# Patient Record
Sex: MALE | NOT HISPANIC OR LATINO | Employment: OTHER | URBAN - METROPOLITAN AREA
[De-identification: names, ages, dates, MRNs, and addresses within clinical notes are randomized per-mention and may not be internally consistent; named-entity substitution may affect disease eponyms.]

---

## 2017-01-07 DIAGNOSIS — E03.8 SECONDARY HYPOTHYROIDISM: ICD-10-CM

## 2017-01-09 RX ORDER — LEVOTHYROXINE SODIUM 75 MCG
TABLET ORAL
Qty: 30 TABLET | Refills: 0 | Status: SHIPPED | OUTPATIENT
Start: 2017-01-09 | End: 2018-08-04

## 2017-02-02 DIAGNOSIS — E78.1 HYPERTRIGLYCERIDEMIA: ICD-10-CM

## 2017-02-02 RX ORDER — ICOSAPENT ETHYL 1000 MG/1
CAPSULE ORAL
Qty: 120 CAPSULE | Refills: 0 | Status: SHIPPED | OUTPATIENT
Start: 2017-02-02 | End: 2017-03-07 | Stop reason: SDUPTHER

## 2017-02-06 ENCOUNTER — OFFICE VISIT (OUTPATIENT)
Dept: ENDOCRINOLOGY | Age: 62
End: 2017-02-06

## 2017-02-06 VITALS
DIASTOLIC BLOOD PRESSURE: 84 MMHG | HEIGHT: 68 IN | WEIGHT: 255 LBS | SYSTOLIC BLOOD PRESSURE: 140 MMHG | BODY MASS INDEX: 38.65 KG/M2

## 2017-02-06 DIAGNOSIS — E67.3 HYPERVITAMINOSIS D: ICD-10-CM

## 2017-02-06 DIAGNOSIS — E03.8 SECONDARY HYPOTHYROIDISM: ICD-10-CM

## 2017-02-06 DIAGNOSIS — R73.9 HYPERGLYCEMIA: ICD-10-CM

## 2017-02-06 DIAGNOSIS — IMO0002 UNCONTROLLED TYPE 2 DIABETES MELLITUS WITH COMPLICATION, WITH LONG-TERM CURRENT USE OF INSULIN: Primary | ICD-10-CM

## 2017-02-06 DIAGNOSIS — Z94.9 TRANSPLANT: ICD-10-CM

## 2017-02-06 DIAGNOSIS — E78.2 MIXED HYPERLIPIDEMIA: ICD-10-CM

## 2017-02-06 PROCEDURE — 99214 OFFICE O/P EST MOD 30 MIN: CPT | Performed by: NURSE PRACTITIONER

## 2017-02-06 RX ORDER — PIOGLITAZONEHYDROCHLORIDE 30 MG/1
30 TABLET ORAL DAILY
Qty: 30 TABLET | Refills: 4 | Status: SHIPPED | OUTPATIENT
Start: 2017-02-06 | End: 2018-02-06

## 2017-02-06 NOTE — PROGRESS NOTES
Deepak David  Presents to the office  for the follow-up appointment for Type 2 diabetes mellitus.     Location is system with the  clinical course has fluctuated, the severity is Moderate, and the modifying/allievating factors are insulin.  Medications and  Dosages were  reviewed with Deepak David and suggested that compliance all of the time.    Associated symptoms of hyperglycemia have been none.  Associated symptoms of hypoglycemia have been jitteriness, sweating and weakness. Patient reports  hypoglycemia threshold for symptoms is 60 mg/dl .     The patient is currently on insulin.    Compliance with blood glucose monitoring: good.     Meal panning: The patient is using avoidance of concentrated sweets.    The patient is currently taking home blood tests - Blood glucose testin times daily, that are:  fasting- 1st thing in morning before eating or drinking  bedtime  Humulin U500 120 units with each meal    Last instructions given were:  Humulin U500  122 units before breakfast, 102 units before lunch, 102 units before dinner     Office visit 24 minutes with a ditional education given 12 minutes - need to titrate insulin, CHO balance, nutrition counseling.    Home blood glucose testing daily: 2  FB to 214 mg/dl  bedtime 200 to 245 mg/dl    Last reported blood glucose readings are:   Home blood glucose testing daily: 3  FB to 210 mg/dl  before lunch 180 to 230 mg/dl  before dinner/supper 280 to 320 mg/dl    Patterns reported per patient are that he cannot get his B/S down below 186. Patient states he doesn't consume and sugars or red meat anymore.          The following portions of the patient's history were reviewed and updated as appropriate: current medications, past family history, past medical history, past social history, past surgical history and problem list.      Current Outpatient Prescriptions:   •  ACCU-CHEK ROSEMARY PLUS test strip, , Disp: , Rfl:   •  allopurinol (ZYLOPRIM) 100 MG  tablet, , Disp: , Rfl:   •  Alogliptin Benzoate (NESINA) 25 MG tablet, Take 1 daily, Disp: 30 tablet, Rfl: 4  •  aspirin 81 MG tablet, Take  by mouth., Disp: , Rfl:   •  betamethasone valerate (VALISONE) 0.1 % cream, Apply  topically., Disp: , Rfl:   •  cefdinir (OMNICEF) 300 MG capsule, Take  by mouth., Disp: , Rfl:   •  colchicine 0.6 MG tablet, Take  by mouth., Disp: , Rfl:   •  febuxostat (ULORIC) 40 MG tablet, Take  by mouth., Disp: , Rfl:   •  fluconazole (DIFLUCAN) 200 MG tablet, Take  by mouth., Disp: , Rfl:   •  furosemide (LASIX) 20 MG tablet, , Disp: , Rfl:   •  gabapentin (NEURONTIN) 300 MG capsule, Take  by mouth., Disp: , Rfl:   •  Insulin Pen Needle 31G X 8 MM misc, 3 inj daily, Disp: 120 each, Rfl: 6  •  Insulin Regular Human, Conc, (HumuLIN R) 500 UNIT/ML solution pen-injector CONCENTRATED injection, Inject 112 units at breakfast, inject 84 units at lunch and dinner will titrate her office with blood glucose phone in., Disp: 9 pen, Rfl: 1  •  isosorbide mononitrate (IMDUR) 30 MG 24 hr tablet, , Disp: , Rfl:   •  leflunomide (ARAVA) 20 MG tablet, Take  by mouth., Disp: , Rfl:   •  levofloxacin (LEVAQUIN) 500 MG tablet, Take  by mouth., Disp: , Rfl:   •  lovastatin (MEVACOR) 40 MG tablet, Take  by mouth., Disp: , Rfl:   •  LYRICA 150 MG capsule, , Disp: , Rfl:   •  metoprolol tartrate (LOPRESSOR) 25 MG tablet, Take  by mouth., Disp: , Rfl:   •  metoprolol tartrate (LOPRESSOR) 50 MG tablet, , Disp: , Rfl:   •  Multiple Vitamin tablet, Take  by mouth., Disp: , Rfl:   •  nitroglycerin (NITROSTAT) 0.4 MG SL tablet, Place  under the tongue., Disp: , Rfl:   •  oxyCODONE (ROXICODONE) 10 MG tablet, , Disp: , Rfl:   •  oxyCODONE (ROXICODONE) 5 MG immediate release tablet, Take  by mouth., Disp: , Rfl:   •  pantoprazole (PROTONIX) 40 MG EC tablet, , Disp: , Rfl:   •  pentoxifylline (TRENtal) 400 MG CR tablet, , Disp: , Rfl:   •  predniSONE (DELTASONE) 5 MG tablet, Take  by mouth., Disp: , Rfl:   •  pregabalin  "(LYRICA) 100 MG capsule, Take 100 mg by mouth 2 (two) times a day., Disp: , Rfl:   •  SYNTHROID 75 MCG tablet, TAKE 1 TABLET BY MOUTH EVERY DAY, Disp: 30 tablet, Rfl: 0  •  Tacrolimus (ASTAGRAF XL) 1 MG capsule sustained-release 24 hr, Take  by mouth., Disp: , Rfl:   •  VASCEPA 1 G capsule capsule, TAKE 2 CAPSULES BY MOUTH TWICE DAILY WITH MEALS, Disp: 120 capsule, Rfl: 0  •  Dulaglutide 0.75 MG/0.5ML solution pen-injector, Inject 0.75 mg under the skin 1 (One) Time Per Week., Disp: 30 pen, Rfl: 4  •  pioglitazone (ACTOS) 30 MG tablet, Take 1 tablet by mouth Daily., Disp: 30 tablet, Rfl: 4    Patient Active Problem List    Diagnosis   • Uncontrolled type 2 diabetes mellitus with complication, with long-term current use of insulin [E11.8, E11.65, Z79.4]       Review of Systems   A comprehensive review of the 14 systems was negative except of listed below:  Endocrine: hyperglycemia     Objective:     Wt Readings from Last 3 Encounters:   02/06/17 255 lb (116 kg)   12/19/16 265 lb 9.6 oz (120 kg)   11/21/16 271 lb 6.4 oz (123 kg)     Temp Readings from Last 3 Encounters:   No data found for Temp     BP Readings from Last 3 Encounters:   02/06/17 140/84   12/19/16 142/88   11/21/16 140/86     Pulse Readings from Last 3 Encounters:   No data found for Pulse          Visit Vitals   • /84   • Ht 68\" (172.7 cm)   • Wt 255 lb (116 kg)   • BMI 38.77 kg/m2       General appearance:  alert, cooperative, delirious, fatigued, nourished\" \"appears stated age and cooperative\" \"NAD   Neck: no carotid bruit, supple, symmetrical, trachea midline and thyroid not enlarged, symmetric, no tenderness/mass/nodules   Thyroid:  no palpable nodule   Lung:  \"clear to auscultation bilaterally\" \"no abnormal breath sounds  \" effort was normal, no labored breath, no use of accessory muscles.   Heart: regular rate and rhythm, S1, S2 normal, no murmur, click, rub or gallop      Abdomen:  normal bowel sounds- 4 quads, soft non-tender, contour - " "rounded and contour - obese      Extremities: extremities normal, atraumatic, no cyanosis or edema extremities normal, atraumatic, no cyanosis or edema\" WNL - gait and station, strength and stability\"       Skin:   Pulses:  warm and dry, no hyperpigmentation, normal skin coloring, or suspicious lesions   2+ and symmetric   Neuro: Alert and oriented x3. Gait normal. Reflexes and motor strength normal and symmetric. Cranial nerves 2-12 and sensation grossly intact.      Psych: behavior - normal, judgement - normal, mood - normal, affect - normal                 Lab Review  Results for orders placed or performed in visit on 12/19/16   C-Peptide   Result Value Ref Range    C-Peptide 5.6 (H) 1.1 - 4.4 ng/mL   Comprehensive Metabolic Panel   Result Value Ref Range    Glucose 416 (C) 65 - 99 mg/dL    BUN 36 (H) 8 - 23 mg/dL    Creatinine 1.65 (H) 0.76 - 1.27 mg/dL    eGFR Non African Am 42 (L) >60 mL/min/1.73    eGFR African Am 51 (L) >60 mL/min/1.73    BUN/Creatinine Ratio 21.8 7.0 - 25.0    Sodium 138 136 - 145 mmol/L    Potassium 4.8 3.5 - 5.2 mmol/L    Chloride 98 98 - 107 mmol/L    Total CO2 24.5 22.0 - 29.0 mmol/L    Calcium 9.3 8.6 - 10.5 mg/dL    Total Protein 6.6 6.0 - 8.5 g/dL    Albumin 4.00 3.50 - 5.20 g/dL    Globulin 2.6 gm/dL    A/G Ratio 1.5 g/dL    Total Bilirubin 0.5 0.1 - 1.2 mg/dL    Alkaline Phosphatase 205 (H) 39 - 117 U/L    AST (SGOT) 82 (H) 1 - 40 U/L    ALT (SGPT) 93 (H) 1 - 41 U/L   Hemoglobin A1c   Result Value Ref Range    Hemoglobin A1C 12.20 (H) 4.80 - 5.60 %   Insulin, Total   Result Value Ref Range    Insulin 284.1 (H) 2.6 - 24.9 uIU/mL   Lipid Panel   Result Value Ref Range    Total Cholesterol 130 0 - 200 mg/dL    Triglycerides 571 (H) 0 - 150 mg/dL    HDL Cholesterol 27 (L) 40 - 60 mg/dL    VLDL Cholesterol CANCELED mg/dL    LDL Cholesterol  CANCELED mg/dL   Vitamin D 25 Hydroxy   Result Value Ref Range    25 Hydroxy, Vitamin D 31.4 30.0 - 100.0 ng/mL   Microalbumin / Creatinine Urine " Ratio   Result Value Ref Range    Creatinine, Urine 41.3 Not Estab. mg/dL    Microalbumin, Urine <3.0 Not Estab. ug/mL    Microalbumin/Creatinine Ratio Urine <7.3 0.0 - 30.0 mg/g creat   T4, Free   Result Value Ref Range    Free T4 0.86 (L) 0.93 - 1.70 ng/dL   TSH   Result Value Ref Range    TSH 1.640 0.270 - 4.200 mIU/mL   T4   Result Value Ref Range    T4, Total 5.02 4.50 - 11.70 mcg/dL   T3   Result Value Ref Range    T3, Total 80.7 80.0 - 200.0 ng/dL   Thyroid Antibodies   Result Value Ref Range    Thyroid Peroxidase Antibody 24 0 - 34 IU/mL    Thyroglobulin Ab <1.0 0.0 - 0.9 IU/mL   T3, Free   Result Value Ref Range    T3, Free 1.9 (L) 2.0 - 4.4 pg/mL               Assessment:   Deepak was seen today for follow-up.    Diagnoses and all orders for this visit:    Uncontrolled type 2 diabetes mellitus with complication, with long-term current use of insulin  -     Dulaglutide 0.75 MG/0.5ML solution pen-injector; Inject 0.75 mg under the skin 1 (One) Time Per Week.  -     pioglitazone (ACTOS) 30 MG tablet; Take 1 tablet by mouth Daily.    Secondary hypothyroidism    Hypervitaminosis D     Transplant    Hyperglycemia    Mixed hyperlipidemia          Plan:    Education:  interpretation of lab results, blood sugar goals, complications of diabetes mellitus, hypoglycemia prevention and treatment, exercise, illness management, self-monitoring of blood glucose skills, nutrition, carbohydrate counting, site rotation, use of insulin pen, insulin adjustments, self-injection of insulin, use of sliding scale/correction formula and use of insulin: carb ratio    home blood tests -  Blood glucose testin times daily, that are: fasting- 1st thing in morning before eating or drinking, before each meal and 1 or 2 hours after meal, bedtime  anytime you feel symptoms of hyperglycemia or hypoglycemia (high or low blood sugars)    New instructions for basal insulIn Humulin U500 - 122 units before breakfast, 122 units before lunch,  122 units before dinner  Start the Trulicity .75mg/dl weekly  Start the actos 30mg daily in the AM    Office visit 24 minutes with additional education given 12 minutes - the need to Ipro, the need for insulin pump therapy, had been approved- was told cost $70.00/month. Was suppose to be ran back through. Will give him information to call [facility].       Return if symptoms worsen or fail to improve. Follow-up appointment in March with Dr. Vergara-one week prior for labs, keep follow-up appointment in May with Lorena.

## 2017-02-06 NOTE — PATIENT INSTRUCTIONS
home blood tests -  Blood glucose testin times daily, that are:  fasting- 1st thing in morning before eating or drinking  before each meal and 1 or 2 hours after meal  bedtime  anytime you feel symptoms of hyperglycemia or hypoglycemia (high or low blood sugars)     New instructions for basal insulIn Humulin U500 - 125 units before breakfast, 125 units before lunch, 125 units before dinner    Start the Trulicity .75mg/dl weekly  Start the actos 30mg daily in the AM

## 2017-02-16 DIAGNOSIS — IMO0002 UNCONTROLLED TYPE 2 DIABETES MELLITUS WITH COMPLICATION, WITH LONG-TERM CURRENT USE OF INSULIN: Primary | ICD-10-CM

## 2017-02-16 DIAGNOSIS — E55.9 VITAMIN D DEFICIENCY: Primary | ICD-10-CM

## 2017-03-06 DIAGNOSIS — IMO0002 UNCONTROLLED TYPE 2 DIABETES MELLITUS WITH COMPLICATION, WITH LONG-TERM CURRENT USE OF INSULIN: ICD-10-CM

## 2017-03-07 ENCOUNTER — OFFICE VISIT (OUTPATIENT)
Dept: ENDOCRINOLOGY | Age: 62
End: 2017-03-07

## 2017-03-07 VITALS
SYSTOLIC BLOOD PRESSURE: 122 MMHG | BODY MASS INDEX: 39.28 KG/M2 | DIASTOLIC BLOOD PRESSURE: 76 MMHG | HEIGHT: 68 IN | WEIGHT: 259.2 LBS | RESPIRATION RATE: 16 BRPM

## 2017-03-07 DIAGNOSIS — E78.1 HYPERTRIGLYCERIDEMIA: ICD-10-CM

## 2017-03-07 DIAGNOSIS — N40.0 BENIGN NON-NODULAR PROSTATIC HYPERPLASIA WITHOUT LOWER URINARY TRACT SYMPTOMS: ICD-10-CM

## 2017-03-07 DIAGNOSIS — E79.0 HYPERURICEMIA: ICD-10-CM

## 2017-03-07 DIAGNOSIS — E03.9 PRIMARY HYPOTHYROIDISM: ICD-10-CM

## 2017-03-07 DIAGNOSIS — E78.5 DYSLIPIDEMIA: ICD-10-CM

## 2017-03-07 DIAGNOSIS — E55.9 VITAMIN D DEFICIENCY: Primary | ICD-10-CM

## 2017-03-07 DIAGNOSIS — E55.9 VITAMIN D DEFICIENCY: ICD-10-CM

## 2017-03-07 DIAGNOSIS — E66.01 MORBID OBESITY, UNSPECIFIED OBESITY TYPE (HCC): ICD-10-CM

## 2017-03-07 DIAGNOSIS — I10 ESSENTIAL HYPERTENSION: ICD-10-CM

## 2017-03-07 DIAGNOSIS — I25.10 CORONARY ARTERY DISEASE DUE TO LIPID RICH PLAQUE: ICD-10-CM

## 2017-03-07 DIAGNOSIS — I25.83 CORONARY ARTERY DISEASE DUE TO LIPID RICH PLAQUE: ICD-10-CM

## 2017-03-07 DIAGNOSIS — IMO0002 UNCONTROLLED TYPE 2 DIABETES MELLITUS WITH COMPLICATION, WITH LONG-TERM CURRENT USE OF INSULIN: Primary | ICD-10-CM

## 2017-03-07 PROCEDURE — 99215 OFFICE O/P EST HI 40 MIN: CPT | Performed by: INTERNAL MEDICINE

## 2017-03-07 RX ORDER — ICOSAPENT ETHYL 1000 MG/1
CAPSULE ORAL
Qty: 120 CAPSULE | Refills: 0 | Status: SHIPPED | OUTPATIENT
Start: 2017-03-07 | End: 2017-03-07 | Stop reason: SDUPTHER

## 2017-03-07 RX ORDER — NATEGLINIDE 60 MG/1
60 TABLET ORAL
Qty: 90 TABLET | Refills: 5 | Status: SHIPPED | OUTPATIENT
Start: 2017-03-07

## 2017-03-07 RX ORDER — INSULIN DEGLUDEC 200 U/ML
150 INJECTION, SOLUTION SUBCUTANEOUS 2 TIMES DAILY
Qty: 15 PEN | Refills: 5 | Status: SHIPPED | OUTPATIENT
Start: 2017-03-07 | End: 2017-04-04 | Stop reason: SDUPTHER

## 2017-03-07 RX ORDER — ICOSAPENT ETHYL 1000 MG/1
2 CAPSULE ORAL 2 TIMES DAILY WITH MEALS
Qty: 120 CAPSULE | Refills: 5 | Status: SHIPPED | OUTPATIENT
Start: 2017-03-07

## 2017-03-07 RX ORDER — INSULIN HUMAN 500 [IU]/ML
INJECTION, SOLUTION SUBCUTANEOUS
Qty: 12 ML | Refills: 0 | Status: SHIPPED | OUTPATIENT
Start: 2017-03-07 | End: 2017-03-07

## 2017-03-07 RX ORDER — DULAGLUTIDE 1.5 MG/.5ML
1.5 INJECTION, SOLUTION SUBCUTANEOUS WEEKLY
Qty: 4 PEN | Refills: 5 | Status: SHIPPED | OUTPATIENT
Start: 2017-03-07

## 2017-03-07 NOTE — PROGRESS NOTES
"Subjective   Deepak David is a 61 y.o. male seen for follow up for DM2. No lab review. Patient is checking BG 3 times a day. He states that his BG is staying over 200. His BG yesterday morning was 263. He states that his feet are painful, burn, and tingles.     History of Present Illness this is a 61-year-old gentleman known patient with hypertension dyslipidemia type II diabetes as well as the peripheral vascular disease and peripheral neuropathy and hyperuricemia and coronary as well as peripheral vascular disease.  He has had no significant health problems for which to go to emergency room or hospital.  On his current regimen his blood sugar almost consistently rise above 200.  Visit Vitals   • /76   • Resp 16   • Ht 68\" (172.7 cm)   • Wt 259 lb 3.2 oz (118 kg)   • BMI 39.41 kg/m2     Allergies   Allergen Reactions   • Sulfa Antibiotics        Current Outpatient Prescriptions:   •  ACCU-CHEK ROSEMARY PLUS test strip, , Disp: , Rfl:   •  allopurinol (ZYLOPRIM) 100 MG tablet, , Disp: , Rfl:   •  Alogliptin Benzoate (NESINA) 25 MG tablet, Take 1 daily, Disp: 30 tablet, Rfl: 4  •  aspirin 81 MG tablet, Take  by mouth., Disp: , Rfl:   •  betamethasone valerate (VALISONE) 0.1 % cream, Apply  topically., Disp: , Rfl:   •  colchicine 0.6 MG tablet, Take  by mouth., Disp: , Rfl:   •  Dulaglutide 0.75 MG/0.5ML solution pen-injector, Inject 0.75 mg under the skin 1 (One) Time Per Week., Disp: 30 pen, Rfl: 4  •  febuxostat (ULORIC) 40 MG tablet, Take  by mouth., Disp: , Rfl:   •  fluconazole (DIFLUCAN) 200 MG tablet, Take  by mouth., Disp: , Rfl:   •  furosemide (LASIX) 20 MG tablet, , Disp: , Rfl:   •  gabapentin (NEURONTIN) 300 MG capsule, Take  by mouth., Disp: , Rfl:   •  HUMULIN R U-500 KWIKPEN 500 UNIT/ML solution pen-injector CONCENTRATED injection, INJECT 112 UNITS UNDER THE SKIN AT BREAKFAST THEN INJECT 84 UNITS AT LUNCH AND DINNER AS DIRECTED, Disp: 12 mL, Rfl: 0  •  Insulin Pen Needle 31G X 8 MM misc, 3 inj " daily, Disp: 120 each, Rfl: 6  •  isosorbide mononitrate (IMDUR) 30 MG 24 hr tablet, , Disp: , Rfl:   •  leflunomide (ARAVA) 20 MG tablet, Take  by mouth., Disp: , Rfl:   •  lovastatin (MEVACOR) 40 MG tablet, Take  by mouth., Disp: , Rfl:   •  LYRICA 150 MG capsule, , Disp: , Rfl:   •  metoprolol tartrate (LOPRESSOR) 25 MG tablet, Take  by mouth., Disp: , Rfl:   •  metoprolol tartrate (LOPRESSOR) 50 MG tablet, , Disp: , Rfl:   •  Multiple Vitamin tablet, Take  by mouth., Disp: , Rfl:   •  nitroglycerin (NITROSTAT) 0.4 MG SL tablet, Place  under the tongue., Disp: , Rfl:   •  oxyCODONE (ROXICODONE) 10 MG tablet, , Disp: , Rfl:   •  oxyCODONE (ROXICODONE) 5 MG immediate release tablet, Take  by mouth., Disp: , Rfl:   •  pantoprazole (PROTONIX) 40 MG EC tablet, , Disp: , Rfl:   •  pentoxifylline (TRENtal) 400 MG CR tablet, , Disp: , Rfl:   •  pioglitazone (ACTOS) 30 MG tablet, Take 1 tablet by mouth Daily., Disp: 30 tablet, Rfl: 4  •  predniSONE (DELTASONE) 5 MG tablet, Take  by mouth., Disp: , Rfl:   •  pregabalin (LYRICA) 100 MG capsule, Take 100 mg by mouth 2 (two) times a day., Disp: , Rfl:   •  SYNTHROID 75 MCG tablet, TAKE 1 TABLET BY MOUTH EVERY DAY, Disp: 30 tablet, Rfl: 0  •  Tacrolimus (ASTAGRAF XL) 1 MG capsule sustained-release 24 hr, Take  by mouth., Disp: , Rfl:   •  VASCEPA 1 G capsule capsule, TAKE 2 CAPSULES BY MOUTH TWICE DAILY WITH MEALS, Disp: 120 capsule, Rfl: 0        The following portions of the patient's history were reviewed and updated as appropriate: allergies, current medications, past family history, past medical history, past social history, past surgical history and problem list.    Review of Systems   Constitutional: Negative.    HENT: Negative.    Eyes: Negative.    Respiratory: Negative.    Cardiovascular: Negative.    Gastrointestinal: Negative.    Endocrine: Negative.    Genitourinary: Negative.    Musculoskeletal: Negative.    Skin: Negative.    Allergic/Immunologic: Negative.     Neurological: Negative.    Hematological: Negative.    Psychiatric/Behavioral: Negative.        Objective   Physical Exam   Constitutional: He is oriented to person, place, and time. He appears well-developed and well-nourished. No distress.   Morbid obesity   HENT:   Head: Normocephalic and atraumatic.   Right Ear: External ear normal.   Left Ear: External ear normal.   Nose: Nose normal.   Mouth/Throat: Oropharynx is clear and moist. No oropharyngeal exudate.   Eyes: Conjunctivae and EOM are normal. Pupils are equal, round, and reactive to light. Right eye exhibits no discharge. Left eye exhibits no discharge. No scleral icterus.   Neck: Normal range of motion. Neck supple. No JVD present. No tracheal deviation present. No thyromegaly present.   Cardiovascular: Normal rate, regular rhythm, normal heart sounds and intact distal pulses.  Exam reveals no gallop and no friction rub.    No murmur heard.  Healed the scar of coronary artery bypass graft surgery in the anterior midline chest and another scar of surgery in his midline and right upper abdominal area status post kidney and liver transplant.   Pulmonary/Chest: Effort normal and breath sounds normal. No stridor. No respiratory distress. He has no wheezes. He has no rales. He exhibits no tenderness.   Abdominal: Soft. Bowel sounds are normal. He exhibits no distension and no mass. There is no tenderness. There is no rebound and no guarding. No hernia.   Musculoskeletal: Normal range of motion. He exhibits no edema, tenderness or deformity.   Lymphadenopathy:     He has no cervical adenopathy.   Neurological: He is alert and oriented to person, place, and time. He has normal reflexes. He displays normal reflexes. No cranial nerve deficit. He exhibits normal muscle tone. Coordination normal.   Skin: Skin is warm and dry. No rash noted. He is not diaphoretic. No erythema. No pallor.   Psychiatric: He has a normal mood and affect. His behavior is normal. Judgment  and thought content normal.   Nursing note and vitals reviewed.        Assessment/Plan   Diagnoses and all orders for this visit:    Uncontrolled type 2 diabetes mellitus with complication, with long-term current use of insulin  -     T3, Free  -     T4 & TSH ([facility])  -     T4, Free  -     Uric Acid  -     Vitamin D 25 Hydroxy  -     Comprehensive Metabolic Panel  -     C-Peptide  -     Follicle Stimulating Hormone  -     Hemoglobin A1c  -     Lipid Panel  -     Luteinizing Hormone  -     MicroAlbumin, Urine, Random  -     PSA  -     ACTH  -     Cortisol  -     Hemoglobin & Hematocrit, Blood    Essential hypertension  -     T3, Free  -     T4 & TSH ([facility])  -     T4, Free  -     Uric Acid  -     Vitamin D 25 Hydroxy  -     Comprehensive Metabolic Panel  -     C-Peptide  -     Follicle Stimulating Hormone  -     Hemoglobin A1c  -     Lipid Panel  -     Luteinizing Hormone  -     MicroAlbumin, Urine, Random  -     PSA  -     ACTH  -     Cortisol  -     Hemoglobin & Hematocrit, Blood    Dyslipidemia  -     T3, Free  -     T4 & TSH ([facility])  -     T4, Free  -     Uric Acid  -     Vitamin D 25 Hydroxy  -     Comprehensive Metabolic Panel  -     C-Peptide  -     Follicle Stimulating Hormone  -     Hemoglobin A1c  -     Lipid Panel  -     Luteinizing Hormone  -     MicroAlbumin, Urine, Random  -     PSA  -     ACTH  -     Cortisol  -     Hemoglobin & Hematocrit, Blood    Hyperuricemia  -     T3, Free  -     T4 & TSH ([facility])  -     T4, Free  -     Uric Acid  -     Vitamin D 25 Hydroxy  -     Comprehensive Metabolic Panel  -     C-Peptide  -     Follicle Stimulating Hormone  -     Hemoglobin A1c  -     Lipid Panel  -     Luteinizing Hormone  -     MicroAlbumin, Urine, Random  -     PSA  -     ACTH  -     Cortisol  -     Hemoglobin & Hematocrit, Blood    Primary hypothyroidism  -     T3, Free  -     T4 & TSH ([facility])  -     T4, Free  -     Uric Acid  -     Vitamin D 25 Hydroxy  -     Comprehensive Metabolic Panel  -      C-Peptide  -     Follicle Stimulating Hormone  -     Hemoglobin A1c  -     Lipid Panel  -     Luteinizing Hormone  -     MicroAlbumin, Urine, Random  -     PSA  -     ACTH  -     Cortisol  -     Hemoglobin & Hematocrit, Blood    Morbid obesity, unspecified obesity type  -     T3, Free  -     T4 & TSH ([facility])  -     T4, Free  -     Uric Acid  -     Vitamin D 25 Hydroxy  -     Comprehensive Metabolic Panel  -     C-Peptide  -     Follicle Stimulating Hormone  -     Hemoglobin A1c  -     Lipid Panel  -     Luteinizing Hormone  -     MicroAlbumin, Urine, Random  -     PSA  -     ACTH  -     Cortisol  -     Hemoglobin & Hematocrit, Blood    Coronary artery disease due to lipid rich plaque  -     T3, Free  -     T4 & TSH ([facility])  -     T4, Free  -     Uric Acid  -     Vitamin D 25 Hydroxy  -     Comprehensive Metabolic Panel  -     C-Peptide  -     Follicle Stimulating Hormone  -     Hemoglobin A1c  -     Lipid Panel  -     Luteinizing Hormone  -     MicroAlbumin, Urine, Random  -     PSA  -     ACTH  -     Cortisol  -     Hemoglobin & Hematocrit, Blood    Benign non-nodular prostatic hyperplasia without lower urinary tract symptoms  -     T3, Free  -     T4 & TSH ([facility])  -     T4, Free  -     Uric Acid  -     Vitamin D 25 Hydroxy  -     Comprehensive Metabolic Panel  -     C-Peptide  -     Follicle Stimulating Hormone  -     Hemoglobin A1c  -     Lipid Panel  -     Luteinizing Hormone  -     MicroAlbumin, Urine, Random  -     PSA  -     ACTH  -     Cortisol  -     Hemoglobin & Hematocrit, Blood    Vitamin D deficiency  -     T3, Free  -     T4 & TSH ([facility])  -     T4, Free  -     Uric Acid  -     Vitamin D 25 Hydroxy  -     Comprehensive Metabolic Panel  -     C-Peptide  -     Follicle Stimulating Hormone  -     Hemoglobin A1c  -     Lipid Panel  -     Luteinizing Hormone  -     MicroAlbumin, Urine, Random  -     PSA  -     ACTH  -     Cortisol  -     Hemoglobin & Hematocrit, Blood    Hypertriglyceridemia  -      icosapent ethyl (VASCEPA) 1 G capsule capsule; Take 2 g by mouth 2 (Two) Times a Day With Meals.    Other orders  -     TRESIBA FLEXTOUCH 200 UNIT/ML solution pen-injector; Inject 150 Units under the skin 2 (Two) Times a Day.  -     TRULICITY 1.5 MG/0.5ML solution pen-injector; Inject 1.5 mg under the skin 1 (One) Time Per Week.  -     nateglinide (STARLIX) 60 MG tablet; Take 1 tablet by mouth 3 (Three) Times a Day Before Meals.               In summary I saw and examined this 61-year-old gentleman for above-mentioned problems.  I reviewed his laboratory evaluation of 12/19/2016 and provided him with a hard copy of it.  At this point we will go ahead and order additional laboratory evaluation and once the results come back we will inform him of any possible modifications in his current medications.  At this point am going to go ahead and is start him on Alfxafs133 units twice daily and increase Trulicity to1..5 mg weekly and is start him on Starlix 60 mg before each meal.  He will continue rest of his medications.  This office visit including patient counseling which occupied 50% of the time lasted in excess of 40 minutes.  He will see Ms. Lorenasascha Suazo in 3 months for follow-up.

## 2017-03-08 ENCOUNTER — TELEPHONE (OUTPATIENT)
Dept: ENDOCRINOLOGY | Age: 62
End: 2017-03-08

## 2017-03-08 LAB
25(OH)D3+25(OH)D2 SERPL-MCNC: 21.9 NG/ML (ref 30–100)
ACTH PLAS-MCNC: 4.1 PG/ML (ref 7.2–63.3)
ALBUMIN SERPL-MCNC: 3.9 G/DL (ref 3.6–4.8)
ALBUMIN/GLOB SERPL: 1.2 {RATIO} (ref 1.1–2.5)
ALP SERPL-CCNC: 261 IU/L (ref 39–117)
ALT SERPL-CCNC: 93 IU/L (ref 0–44)
AST SERPL-CCNC: 114 IU/L (ref 0–40)
BILIRUB SERPL-MCNC: 0.5 MG/DL (ref 0–1.2)
BUN SERPL-MCNC: 33 MG/DL (ref 8–27)
BUN/CREAT SERPL: 21 (ref 10–22)
C PEPTIDE SERPL-MCNC: 5.7 NG/ML (ref 1.1–4.4)
CALCIUM SERPL-MCNC: 9.5 MG/DL (ref 8.6–10.2)
CHLORIDE SERPL-SCNC: 91 MMOL/L (ref 96–106)
CHOLEST SERPL-MCNC: 167 MG/DL (ref 100–199)
CO2 SERPL-SCNC: 21 MMOL/L (ref 18–29)
CORTIS SERPL-MCNC: 1.9 UG/DL
CREAT SERPL-MCNC: 1.6 MG/DL (ref 0.76–1.27)
FSH SERPL-ACNC: 7.1 MIU/ML (ref 1.5–12.4)
GLOBULIN SER CALC-MCNC: 3.2 G/DL (ref 1.5–4.5)
GLUCOSE SERPL-MCNC: 471 MG/DL (ref 65–99)
HBA1C MFR BLD: 14.6 % (ref 4.8–5.6)
HCT VFR BLD AUTO: 46.3 % (ref 37.5–51)
HDLC SERPL-MCNC: 27 MG/DL
HGB BLD-MCNC: 15.2 G/DL (ref 12.6–17.7)
LDLC SERPL CALC-MCNC: ABNORMAL MG/DL (ref 0–99)
LH SERPL-ACNC: 9.6 MIU/ML (ref 1.7–8.6)
MICROALBUMIN UR-MCNC: <3 UG/ML
POTASSIUM SERPL-SCNC: 5 MMOL/L (ref 3.5–5.2)
PROT SERPL-MCNC: 7.1 G/DL (ref 6–8.5)
PSA SERPL-MCNC: 0.2 NG/ML (ref 0–4)
SODIUM SERPL-SCNC: 134 MMOL/L (ref 134–144)
T3FREE SERPL-MCNC: 2 PG/ML (ref 2–4.4)
T4 FREE SERPL-MCNC: 0.89 NG/DL (ref 0.82–1.77)
T4 SERPL-MCNC: 5.8 UG/DL (ref 4.5–12)
TRIGL SERPL-MCNC: 653 MG/DL (ref 0–149)
TSH SERPL DL<=0.005 MIU/L-ACNC: 1.27 UIU/ML (ref 0.45–4.5)
URATE SERPL-MCNC: 6.8 MG/DL (ref 3.7–8.6)
VLDLC SERPL CALC-MCNC: ABNORMAL MG/DL (ref 5–40)

## 2017-03-08 RX ORDER — ERGOCALCIFEROL 1.25 MG/1
50000 CAPSULE ORAL WEEKLY
Qty: 13 CAPSULE | Refills: 3 | Status: SHIPPED | OUTPATIENT
Start: 2017-03-08 | End: 2018-03-08

## 2017-03-15 ENCOUNTER — TELEPHONE (OUTPATIENT)
Dept: ENDOCRINOLOGY | Age: 62
End: 2017-03-15

## 2017-03-15 NOTE — TELEPHONE ENCOUNTER
----- Message from Guille Vergara MD sent at 3/14/2017  8:37 AM EDT -----  Contact: JOSSUE   I am not sure what is going on with this man.  He was previously on Trulicity and I only dealt the dose.  His hemoglobin A1c was 14.5.  Either he was not using his medications before his last office visit which now he has somewhat of a low blood sugar.  Allison ask him if he was using U500 when he was on it?  Ask him not to take Trulicity and use Tresiba and the nateglinide only and keep us informed  ----- Message -----     From: Mana Goss MA     Sent: 3/14/2017   7:51 AM       To: Guille Vergara MD        ----- Message -----     From: Kristen Pavan     Sent: 3/13/2017   9:43 AM       To: Mana Goss MA    CARE MANAGER W/ [employer]: [phone]    SAID SHE IS WITH PT AND SAID HE IS HAVING ISSUES W/ HIS MEDICATION(S), TRESIBA 200UNIT/ UNITS 2XDAY, TRULICITY 1.5MG 1XWK, AND STARLIX 60MG 3XDAY BEFORE MEALS.  HE HAS NOT STARTED THE STARLIX YET, BUT DID START HIS INJECTABLES ON Wednesday 3/8/2017.    COMPLAINTS OF NAUSEA,W/O PAIN. HE HASN'T BEEN ABLE TO EAT A LOT/AT ALL DUE TO THIS ISSUE. ALL HE HAD YESTERDAY WAS ICE CREAM.    THIS AM HE DIDN'T FEEL WELL WITHER BUT HIS BG WAS 87.    WOULD LIKE TO HAVE A CB FOR INSTRUCTIONS ON WHAT TO DO.    Left patient a voicemail to call our office 03/15/2017 3:59PM

## 2017-03-17 ENCOUNTER — RESULTS ENCOUNTER (OUTPATIENT)
Dept: ENDOCRINOLOGY | Age: 62
End: 2017-03-17

## 2017-03-17 DIAGNOSIS — E55.9 VITAMIN D DEFICIENCY: ICD-10-CM

## 2017-03-17 DIAGNOSIS — IMO0002 UNCONTROLLED TYPE 2 DIABETES MELLITUS WITH COMPLICATION, WITH LONG-TERM CURRENT USE OF INSULIN: ICD-10-CM

## 2017-04-04 RX ORDER — INSULIN DEGLUDEC 200 U/ML
200 INJECTION, SOLUTION SUBCUTANEOUS 2 TIMES DAILY
Qty: 20 PEN | Refills: 5 | Status: SHIPPED | OUTPATIENT
Start: 2017-04-04 | End: 2017-04-10 | Stop reason: SDUPTHER

## 2017-04-07 DIAGNOSIS — E78.1 HYPERTRIGLYCERIDEMIA: ICD-10-CM

## 2017-04-07 RX ORDER — ICOSAPENT ETHYL 1000 MG/1
CAPSULE ORAL
Qty: 120 CAPSULE | Refills: 0 | Status: SHIPPED | OUTPATIENT
Start: 2017-04-07

## 2017-04-10 RX ORDER — INSULIN DEGLUDEC 200 U/ML
200 INJECTION, SOLUTION SUBCUTANEOUS 2 TIMES DAILY
Qty: 20 PEN | Refills: 5 | Status: SHIPPED | OUTPATIENT
Start: 2017-04-10 | End: 2017-04-20 | Stop reason: SDUPTHER

## 2017-04-20 RX ORDER — INSULIN DEGLUDEC 200 U/ML
200 INJECTION, SOLUTION SUBCUTANEOUS 2 TIMES DAILY
Qty: 20 PEN | Refills: 5 | Status: SHIPPED | OUTPATIENT
Start: 2017-04-20

## 2017-05-31 ENCOUNTER — TELEPHONE (OUTPATIENT)
Dept: ENDOCRINOLOGY | Age: 62
End: 2017-05-31

## 2017-08-09 DIAGNOSIS — E03.9 PRIMARY HYPOTHYROIDISM: ICD-10-CM

## 2017-08-09 DIAGNOSIS — E78.1 HYPERTRIGLYCERIDEMIA: ICD-10-CM

## 2017-08-09 DIAGNOSIS — I10 ESSENTIAL HYPERTENSION: Primary | ICD-10-CM

## 2017-08-09 DIAGNOSIS — E55.9 VITAMIN D DEFICIENCY: ICD-10-CM

## 2017-08-09 DIAGNOSIS — IMO0002 UNCONTROLLED TYPE 2 DIABETES MELLITUS WITH COMPLICATION, WITH LONG-TERM CURRENT USE OF INSULIN: ICD-10-CM

## 2017-09-09 ENCOUNTER — RESULTS ENCOUNTER (OUTPATIENT)
Dept: ENDOCRINOLOGY | Age: 62
End: 2017-09-09

## 2017-09-09 DIAGNOSIS — E55.9 VITAMIN D DEFICIENCY: ICD-10-CM

## 2017-09-09 DIAGNOSIS — I10 ESSENTIAL HYPERTENSION: ICD-10-CM

## 2017-09-09 DIAGNOSIS — E03.9 PRIMARY HYPOTHYROIDISM: ICD-10-CM

## 2017-09-09 DIAGNOSIS — E78.1 HYPERTRIGLYCERIDEMIA: ICD-10-CM

## 2017-09-09 DIAGNOSIS — IMO0002 UNCONTROLLED TYPE 2 DIABETES MELLITUS WITH COMPLICATION, WITH LONG-TERM CURRENT USE OF INSULIN: ICD-10-CM

## 2017-09-14 DIAGNOSIS — E78.1 HYPERTRIGLYCERIDEMIA: ICD-10-CM

## 2017-09-15 RX ORDER — ICOSAPENT ETHYL 1000 MG/1
CAPSULE ORAL
Qty: 120 CAPSULE | Refills: 0 | OUTPATIENT
Start: 2017-09-15

## 2018-02-09 RX ORDER — NATEGLINIDE 60 MG/1
TABLET ORAL
Qty: 90 TABLET | Refills: 0 | OUTPATIENT
Start: 2018-02-09

## 2018-06-18 RX ORDER — ERGOCALCIFEROL 1.25 MG/1
CAPSULE ORAL
Qty: 13 CAPSULE | Refills: 0 | OUTPATIENT
Start: 2018-06-18